# Patient Record
Sex: MALE | Race: WHITE | URBAN - METROPOLITAN AREA
[De-identification: names, ages, dates, MRNs, and addresses within clinical notes are randomized per-mention and may not be internally consistent; named-entity substitution may affect disease eponyms.]

---

## 2022-05-30 ENCOUNTER — HOSPITAL ENCOUNTER (EMERGENCY)
Dept: GENERAL RADIOLOGY | Age: 57
Discharge: HOME | End: 2022-06-02
Payer: COMMERCIAL

## 2022-05-30 ENCOUNTER — HOSPITAL ENCOUNTER (EMERGENCY)
Age: 57
Discharge: HOME | End: 2022-05-30
Attending: EMERGENCY MEDICINE
Payer: COMMERCIAL

## 2022-05-30 VITALS
SYSTOLIC BLOOD PRESSURE: 139 MMHG | DIASTOLIC BLOOD PRESSURE: 95 MMHG | HEART RATE: 99 BPM | WEIGHT: 185 LBS | OXYGEN SATURATION: 98 % | HEIGHT: 72 IN | BODY MASS INDEX: 25.06 KG/M2 | RESPIRATION RATE: 18 BRPM | TEMPERATURE: 98.8 F

## 2022-05-30 DIAGNOSIS — S61.452A CAT BITE OF LEFT HAND, INITIAL ENCOUNTER: Primary | ICD-10-CM
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
## 2022-05-30 PROCEDURE — 90714 TD VACC NO PRESV 7 YRS+ IM: CPT | Performed by: PHYSICIAN ASSISTANT

## 2022-05-30 PROCEDURE — 99284 EMERGENCY DEPT VISIT MOD MDM: CPT

## 2022-05-30 PROCEDURE — 73130 X-RAY EXAM OF HAND: CPT

## 2022-05-30 PROCEDURE — 90471 IMMUNIZATION ADMIN: CPT | Performed by: PHYSICIAN ASSISTANT

## 2022-05-30 PROCEDURE — 6360000002 HC RX W HCPCS: Performed by: PHYSICIAN ASSISTANT

## 2022-05-30 PROCEDURE — 96372 THER/PROPH/DIAG INJ SC/IM: CPT

## 2022-05-30 RX ORDER — IBUPROFEN 800 MG/1
800 TABLET ORAL EVERY 8 HOURS PRN
Qty: 21 TABLET | Refills: 0 | Status: SHIPPED | OUTPATIENT
Start: 2022-05-30 | End: 2022-05-30 | Stop reason: SDUPTHER

## 2022-05-30 RX ORDER — AMOXICILLIN AND CLAVULANATE POTASSIUM 875; 125 MG/1; MG/1
1 TABLET, FILM COATED ORAL 2 TIMES DAILY
Qty: 14 TABLET | Refills: 0 | Status: SHIPPED | OUTPATIENT
Start: 2022-05-30 | End: 2022-06-06

## 2022-05-30 RX ORDER — TETANUS AND DIPHTHERIA TOXOIDS ADSORBED 2; 2 [LF]/.5ML; [LF]/.5ML
0.5 INJECTION INTRAMUSCULAR
Status: COMPLETED | OUTPATIENT
Start: 2022-05-30 | End: 2022-05-30

## 2022-05-30 RX ADMIN — TETANUS AND DIPHTHERIA TOXOIDS ADSORBED 0.5 ML: 2; 2 INJECTION INTRAMUSCULAR at 17:27

## 2022-05-30 ASSESSMENT — ENCOUNTER SYMPTOMS
NAUSEA: 0
SHORTNESS OF BREATH: 0
BACK PAIN: 0
VOMITING: 0
ABDOMINAL PAIN: 0

## 2022-05-30 ASSESSMENT — PAIN DESCRIPTION - ORIENTATION: ORIENTATION: LEFT

## 2022-05-30 ASSESSMENT — PAIN DESCRIPTION - LOCATION: LOCATION: HAND

## 2022-05-30 ASSESSMENT — PAIN DESCRIPTION - DESCRIPTORS: DESCRIPTORS: ACHING

## 2022-05-30 ASSESSMENT — PAIN DESCRIPTION - PAIN TYPE: TYPE: ACUTE PAIN

## 2022-05-30 ASSESSMENT — PAIN - FUNCTIONAL ASSESSMENT: PAIN_FUNCTIONAL_ASSESSMENT: 0-10

## 2022-05-30 ASSESSMENT — PAIN SCALES - GENERAL: PAINLEVEL_OUTOF10: 8

## 2022-05-30 NOTE — ED NOTES
I have reviewed discharge instructions with the patient. The patient verbalized understanding. Patient left ED via Discharge Method: ambulatory to Home with self. Opportunity for questions and clarification provided. Patient given 1 scripts. To continue your aftercare when you leave the hospital, you may receive an automated call from our care team to check in on how you are doing. This is a free service and part of our promise to provide the best care and service to meet your aftercare needs.  If you have questions, or wish to unsubscribe from this service please call [phone]. Thank you for Choosing our [facility] Emergency Department.         [facility]  05/30/22 [id number]

## 2022-05-30 NOTE — ED PROVIDER NOTES
[employer] Emergency Department Provider Note                   PCP:                TERESO Lion NP               Age: 60 y.o. Sex: male       ICD-10-CM    1. Cat bite of left hand, initial encounter  S61.452A     W55. 01XA        DISPOSITION Decision To Discharge 05/30/2022 05:21:25 PM       Discharge Medication List as of 5/30/2022  5:28 PM      START taking these medications    Details   amoxicillin-clavulanate (AUGMENTIN) 875-125 MG per tablet Take 1 tablet by mouth 2 times daily for 7 days, Disp-14 tablet, R-0Print             Orders Placed This Encounter   Procedures    XR HAND LEFT (MIN 3 VIEWS)        MDM  Number of Diagnoses or Management Options  Cat bite of left hand, initial encounter  Diagnosis management comments: In summary this is a well-appearing 38-year-old male presenting today for a cat bite to the left hand. The cat is up-to-date on all vaccines, patient's tetanus was updated here today. X-ray negative for any retained foreign body. Will treat with Augmentin and patient is to follow-up with PCP or return to the ER if he develops any new or worsening symptoms. Amount and/or Complexity of Data Reviewed  Tests in the radiology section of CPT®: ordered and reviewed  Tests in the medicine section of CPT®: ordered    Patient Progress  Patient progress: stable       Fernando Simpson is a 60 y.o. male who presents to the Emergency Department with chief complaint of    Chief Complaint   Patient presents with    Animal Bite      38-year-old well-appearing male presents today for evaluation of a cat bite to his left hand at the base of the thumb. Injury occurred last night. He states that the cat is his and it is up-to-date on all immunizations. He states that he has had some pain in the thumb with range of motion since then with a small area of redness surrounding the bite. He denies any red streaking up the arm, fever or chills.   He is unsure of his last tetanus immunization. Denies any past medical history. No recent hospitalizations. And dominant    The history is provided by the patient. No  was used. All other systems reviewed and are negative. Review of Systems   Constitutional: Negative for appetite change, chills, fatigue and fever. Respiratory: Negative for shortness of breath. Cardiovascular: Negative for chest pain. Gastrointestinal: Negative for abdominal pain, nausea and vomiting. Musculoskeletal: Negative for back pain and neck pain. Skin: Positive for wound (Cat bite left thumb). Allergic/Immunologic: Negative for immunocompromised state. Neurological: Negative for weakness, numbness and headaches. History reviewed. No pertinent past medical history. History reviewed. No pertinent surgical history. History reviewed. No pertinent family history. Social Connections:     Frequency of Communication with Friends and Family: Not on file    Frequency of Social Gatherings with Friends and Family: Not on file    Attends Scientologist Services: Not on file    Active Member of Clubs or Organizations: Not on file    Attends Club or Organization Meetings: Not on file    Marital Status: Not on file        Allergies   Allergen Reactions    Statins Other (See Comments)     \" I black out\"    Hydrocodone-Acetaminophen Itching     \"I am not allergic but say I am bc I dont want to get addicted\"          Vitals signs and nursing note reviewed. Patient Vitals for the past 4 hrs:   Temp Pulse Resp BP SpO2   05/30/22 1646 98.8 °F (37.1 °C) 99 18 (!) 139/95 98 %          Physical Exam  Vitals and nursing note reviewed. Constitutional:       General: He is not in acute distress. Appearance: Normal appearance. HENT:      Head: Normocephalic and atraumatic. Mouth/Throat:      Mouth: Mucous membranes are moist.      Pharynx: Oropharynx is clear. No oropharyngeal exudate.    Eyes:      Conjunctiva/sclera: Conjunctivae normal.      Pupils: Pupils are equal, round, and reactive to light. Cardiovascular:      Rate and Rhythm: Normal rate and regular rhythm. Heart sounds: No murmur heard. No friction rub. No gallop. Pulmonary:      Effort: Pulmonary effort is normal.      Breath sounds: No wheezing, rhonchi or rales. Musculoskeletal:      Left hand: Tenderness present. Normal range of motion. There is no disruption of two-point discrimination. Normal capillary refill. Normal pulse. Hands:       Comments: No red streaking proximally up the left upper extremity   Skin:     General: Skin is warm and dry. Capillary Refill: Capillary refill takes less than 2 seconds. Neurological:      Mental Status: He is alert and oriented to person, place, and time. Mental status is at baseline. Sensory: No sensory deficit. Psychiatric:         Mood and Affect: Mood normal.         Thought Content: Thought content normal.         Judgment: Judgment normal.          Procedures    Labs Reviewed - No data to display     XR HAND LEFT (MIN 3 VIEWS)   Final Result   1. No acute osseous abnormality. 2.  No radiopaque foreign body. Royal Coma Scale  Eye Opening: Spontaneous  Best Verbal Response: Oriented  Best Motor Response: Obeys commands  Royal Coma Scale Score: 15                     Voice dictation software was used during the making of this note. This software is not perfect and grammatical and other typographical errors may be present. This note has not been completely proofread for errors.         [facility]  05/30/22 [id number]

## 2022-05-30 NOTE — ED TRIAGE NOTES
Pt reports cat bite to L hand last night 2300, cat is pt's cat and is UTD on all immunizations. (+)scratches to L arm (-)fevers  A&Ox4

## 2023-10-24 ENCOUNTER — INITIAL CONSULT (OUTPATIENT)
Age: 58
End: 2023-10-24
Payer: COMMERCIAL

## 2023-10-24 VITALS
WEIGHT: 188.2 LBS | SYSTOLIC BLOOD PRESSURE: 150 MMHG | DIASTOLIC BLOOD PRESSURE: 90 MMHG | BODY MASS INDEX: 25.49 KG/M2 | HEART RATE: 78 BPM | HEIGHT: 72 IN

## 2023-10-24 DIAGNOSIS — I10 PRIMARY HYPERTENSION: ICD-10-CM

## 2023-10-24 DIAGNOSIS — I73.9 CLAUDICATION (HCC): Primary | ICD-10-CM

## 2023-10-24 DIAGNOSIS — I25.119 CORONARY ARTERY DISEASE INVOLVING NATIVE CORONARY ARTERY OF NATIVE HEART WITH ANGINA PECTORIS (HCC): ICD-10-CM

## 2023-10-24 PROCEDURE — 99204 OFFICE O/P NEW MOD 45 MIN: CPT | Performed by: INTERNAL MEDICINE

## 2023-10-24 PROCEDURE — 3080F DIAST BP >= 90 MM HG: CPT | Performed by: INTERNAL MEDICINE

## 2023-10-24 PROCEDURE — 93000 ELECTROCARDIOGRAM COMPLETE: CPT | Performed by: INTERNAL MEDICINE

## 2023-10-24 PROCEDURE — 3077F SYST BP >= 140 MM HG: CPT | Performed by: INTERNAL MEDICINE

## 2023-10-24 RX ORDER — ASPIRIN 325 MG
325 TABLET ORAL DAILY
COMMUNITY

## 2023-10-24 RX ORDER — CHLORTHALIDONE 50 MG/1
50 TABLET ORAL DAILY
COMMUNITY

## 2023-10-24 RX ORDER — FENOFIBRATE 160 MG/1
160 TABLET ORAL DAILY
COMMUNITY

## 2023-10-24 RX ORDER — AMLODIPINE BESYLATE 10 MG/1
10 TABLET ORAL DAILY
COMMUNITY

## 2023-10-24 ASSESSMENT — ENCOUNTER SYMPTOMS
ABDOMINAL PAIN: 0
SHORTNESS OF BREATH: 0
ORTHOPNEA: 0

## 2023-10-24 NOTE — PROGRESS NOTES
[facility] CARDIOLOGY  [address] [place] [address]  PHONE: [phone]      10/24/23    NAME:  Sheryl Raymundo  : 1965  MRN: [id number]         SUBJECTIVE:   Sheryl Raymundo is a 59 y.o. male seen for a consultation visit regarding the following:     Chief Complaint   Patient presents with    Consultation     Hypertension/CAD            HPI:  Consultation is requested by TERESO Stein NP for evaluation of Consultation (Hypertension/CAD)   . Mr. Bogdan Spain presents today for follow-up. Patient has a history of hypertension and coronary artery disease. He underwent left heart catheterization back in , had some small vessel disease, not amenable to PCI. Chief complaint today is leg weakness with activity. Says he walks upstairs, his legs become severely weak and almost give out. He has to sit and rest and then take the elevator back down. He has no chest pain or shortness of breath. He unfortunately has continued to smoke. Key CAD CHF Meds            amLODIPine (NORVASC) 10 MG tablet (Taking)    Class: Historical Med    chlorthalidone (HYGROTEN) 50 MG tablet (Taking)    Class: Historical Med    fenofibrate (TRIGLIDE) 160 MG tablet (Taking)    Class: Historical Med          Key Antihyperglycemic Medications            dapagliflozin (FARXIGA) 10 MG tablet (Taking)    Class: Historical Med              Past Medical History, Past Surgical History, Family history, Social History, and Medications were all reviewed with the patient today and updated as necessary. Prior to Admission medications    Medication Sig Start Date End Date Taking?  Authorizing Provider   ALBUTEROL IN Inhale into the lungs   Yes ProviderJoellen MD   amLODIPine (NORVASC) 10 MG tablet Take 1 tablet by mouth daily   Yes Joellen Hewitt MD   chlorthalidone (HYGROTEN) 50 MG tablet Take 1 tablet by mouth daily   Yes Joellen Hewitt MD   dapagliflozin (FARXIGA) 10 MG

## 2023-11-27 ENCOUNTER — OFFICE VISIT (OUTPATIENT)
Age: 58
End: 2023-11-27
Payer: COMMERCIAL

## 2023-11-27 VITALS
HEIGHT: 72 IN | BODY MASS INDEX: 24.73 KG/M2 | HEART RATE: 88 BPM | WEIGHT: 182.6 LBS | DIASTOLIC BLOOD PRESSURE: 94 MMHG | SYSTOLIC BLOOD PRESSURE: 152 MMHG

## 2023-11-27 DIAGNOSIS — M48.062 PSEUDOCLAUDICATION: ICD-10-CM

## 2023-11-27 DIAGNOSIS — I25.119 CORONARY ARTERY DISEASE INVOLVING NATIVE CORONARY ARTERY OF NATIVE HEART WITH ANGINA PECTORIS (HCC): Primary | ICD-10-CM

## 2023-11-27 DIAGNOSIS — I10 PRIMARY HYPERTENSION: ICD-10-CM

## 2023-11-27 PROCEDURE — 99214 OFFICE O/P EST MOD 30 MIN: CPT | Performed by: INTERNAL MEDICINE

## 2023-11-27 PROCEDURE — 3077F SYST BP >= 140 MM HG: CPT | Performed by: INTERNAL MEDICINE

## 2023-11-27 PROCEDURE — 3080F DIAST BP >= 90 MM HG: CPT | Performed by: INTERNAL MEDICINE

## 2023-11-27 ASSESSMENT — ENCOUNTER SYMPTOMS
ORTHOPNEA: 0
ABDOMINAL PAIN: 0
SHORTNESS OF BREATH: 0

## 2023-11-27 NOTE — PROGRESS NOTES
[facility] CARDIOLOGY  [address] [address]  PHONE: [phone]      23    NAME:  Rosa Chavez  : 1965  MRN: [id number]         SUBJECTIVE:   Rosa Chavez is a 59 y.o. male seen for a follow up visit regarding the following:     Chief Complaint   Patient presents with    Results     Vascular duplex lower extremity arteries bilateral w/NANY    Coronary Artery Disease    Hypertension            HPI:  Follow up  Results (Vascular duplex lower extremity arteries bilateral w/NANY), Coronary Artery Disease, and Hypertension   . Mr. Cj Smith presents today for follow-up. We reviewed his lower extremity arterial ultrasound which showed mild atherosclerosis without significant stenosis. Patient had a x-ray on his lumbar spine recently which showed severe degenerative changes. He has no new complaints today. Coronary Artery Disease  Pertinent negatives include no chest pain, leg swelling, palpitations or shortness of breath. Hypertension  Pertinent negatives include no chest pain, orthopnea, palpitations, PND or shortness of breath. Key CAD CHF Meds            amLODIPine (NORVASC) 10 MG tablet (Taking)    Class: Historical Med    chlorthalidone (HYGROTEN) 50 MG tablet (Taking)    Class: Historical Med    fenofibrate (TRIGLIDE) 160 MG tablet (Taking)    Class: Historical Med          Key Antihyperglycemic Medications            dapagliflozin (FARXIGA) 10 MG tablet (Taking)    Class: Historical Med                Past Medical History, Past Surgical History, Family history, Social History, and Medications were all reviewed with the patient today and updated as necessary. Prior to Admission medications    Medication Sig Start Date End Date Taking?  Authorizing Provider   MAGNESIUM PO Take by mouth   Yes Provider, MD Joellen   ALBUTEROL IN Inhale into the lungs   Yes Provider, MD Joellen   amLODIPine (NORVASC) 10 MG tablet Take 1 tablet by mouth

## 2023-11-28 ENCOUNTER — TELEPHONE (OUTPATIENT)
Age: 58
End: 2023-11-28

## 2023-11-28 NOTE — TELEPHONE ENCOUNTER
Hailey with [facility] called stating she would like the patients OV notes from (11/27) with Dr García.    Please fax to :     [phone]

## 2024-03-07 ENCOUNTER — APPOINTMENT (OUTPATIENT)
Dept: GENERAL RADIOLOGY | Age: 59
End: 2024-03-07
Payer: COMMERCIAL

## 2024-03-07 ENCOUNTER — HOSPITAL ENCOUNTER (EMERGENCY)
Age: 59
Discharge: HOME | End: 2024-03-07
Payer: COMMERCIAL

## 2024-03-07 VITALS
HEIGHT: 72 IN | SYSTOLIC BLOOD PRESSURE: 133 MMHG | OXYGEN SATURATION: 100 % | DIASTOLIC BLOOD PRESSURE: 88 MMHG | HEART RATE: 73 BPM | RESPIRATION RATE: 13 BRPM | TEMPERATURE: 97.6 F | BODY MASS INDEX: 24.65 KG/M2 | WEIGHT: 182 LBS

## 2024-03-07 DIAGNOSIS — E86.0 DEHYDRATION: Primary | ICD-10-CM

## 2024-03-07 DIAGNOSIS — R42 LIGHTHEADEDNESS: ICD-10-CM

## 2024-03-07 LAB
ALBUMIN SERPL-MCNC: 4.6 G/DL (ref 3.2–4.6)
ALBUMIN/GLOB SERPL: 1.2 (ref 0.4–1.6)
ALP SERPL-CCNC: 75 U/L (ref 50–136)
ALT SERPL-CCNC: 39 U/L (ref 12–65)
ANION GAP SERPL CALC-SCNC: 6 MMOL/L (ref 2–11)
AST SERPL-CCNC: 27 U/L (ref 15–37)
BASOPHILS # BLD: 0 K/UL (ref 0–0.2)
BASOPHILS NFR BLD: 0 % (ref 0–2)
BILIRUB SERPL-MCNC: 0.5 MG/DL (ref 0.2–1.1)
BILIRUB UR QL: NEGATIVE
BUN SERPL-MCNC: 29 MG/DL (ref 8–23)
CALCIUM SERPL-MCNC: 9.7 MG/DL (ref 8.3–10.4)
CHLORIDE SERPL-SCNC: 105 MMOL/L (ref 103–113)
CO2 SERPL-SCNC: 28 MMOL/L (ref 21–32)
CREAT SERPL-MCNC: 2.28 MG/DL (ref 0.8–1.5)
DIFFERENTIAL METHOD BLD: ABNORMAL
EOSINOPHIL # BLD: 0.1 K/UL (ref 0–0.8)
EOSINOPHIL NFR BLD: 1 % (ref 0.5–7.8)
ERYTHROCYTE [DISTWIDTH] IN BLOOD BY AUTOMATED COUNT: 12.9 % (ref 11.9–14.6)
GLOBULIN SER CALC-MCNC: 3.9 G/DL (ref 2.8–4.5)
GLUCOSE SERPL-MCNC: 119 MG/DL (ref 65–100)
GLUCOSE UR QL STRIP.AUTO: 500 MG/DL
HCT VFR BLD AUTO: 51 % (ref 41.1–50.3)
HGB BLD-MCNC: 16.7 G/DL (ref 13.6–17.2)
IMM GRANULOCYTES # BLD AUTO: 0.1 K/UL (ref 0–0.5)
IMM GRANULOCYTES NFR BLD AUTO: 0 % (ref 0–5)
LYMPHOCYTES # BLD: 1.1 K/UL (ref 0.5–4.6)
LYMPHOCYTES NFR BLD: 7 % (ref 13–44)
MCH RBC QN AUTO: 30.4 PG (ref 26.1–32.9)
MCHC RBC AUTO-ENTMCNC: 32.7 G/DL (ref 31.4–35)
MCV RBC AUTO: 92.7 FL (ref 82–102)
MONOCYTES # BLD: 0.6 K/UL (ref 0.1–1.3)
MONOCYTES NFR BLD: 4 % (ref 4–12)
NEUTS SEG # BLD: 14.2 K/UL (ref 1.7–8.2)
NEUTS SEG NFR BLD: 88 % (ref 43–78)
NITRITE UR QL: NEGATIVE
NRBC # BLD: 0 K/UL (ref 0–0.2)
PH UR: 7 (ref 5–9)
PLATELET # BLD AUTO: 260 K/UL (ref 150–450)
PMV BLD AUTO: 10.9 FL (ref 9.4–12.3)
POTASSIUM SERPL-SCNC: 3.7 MMOL/L (ref 3.5–5.1)
PROT SERPL-MCNC: 8.5 G/DL (ref 6.3–8.2)
PROT UR QL: NEGATIVE MG/DL
RBC # BLD AUTO: 5.5 M/UL (ref 4.23–5.6)
RBC # UR STRIP: NEGATIVE
SERVICE CMNT-IMP: ABNORMAL
SODIUM SERPL-SCNC: 139 MMOL/L (ref 136–146)
SP GR UR: 1.01 (ref 1–1.02)
UROBILINOGEN UR QL: 0.2 EU/DL (ref 0.2–1)
WBC # BLD AUTO: 16.1 K/UL (ref 4.3–11.1)

## 2024-03-07 PROCEDURE — 99285 EMERGENCY DEPT VISIT HI MDM: CPT

## 2024-03-07 PROCEDURE — 80053 COMPREHEN METABOLIC PANEL: CPT

## 2024-03-07 PROCEDURE — 81003 URINALYSIS AUTO W/O SCOPE: CPT

## 2024-03-07 PROCEDURE — 93005 ELECTROCARDIOGRAM TRACING: CPT | Performed by: PHYSICIAN ASSISTANT

## 2024-03-07 PROCEDURE — 2580000003 HC RX 258: Performed by: PHYSICIAN ASSISTANT

## 2024-03-07 PROCEDURE — 96360 HYDRATION IV INFUSION INIT: CPT

## 2024-03-07 PROCEDURE — 85025 COMPLETE CBC W/AUTO DIFF WBC: CPT

## 2024-03-07 PROCEDURE — 71046 X-RAY EXAM CHEST 2 VIEWS: CPT

## 2024-03-07 RX ORDER — 0.9 % SODIUM CHLORIDE 0.9 %
1000 INTRAVENOUS SOLUTION INTRAVENOUS
Status: COMPLETED | OUTPATIENT
Start: 2024-03-07 | End: 2024-03-07

## 2024-03-07 RX ADMIN — SODIUM CHLORIDE 1000 ML: 9 INJECTION, SOLUTION INTRAVENOUS at 20:38

## 2024-03-07 ASSESSMENT — ENCOUNTER SYMPTOMS
ABDOMINAL PAIN: 0
NAUSEA: 0
BACK PAIN: 0
EYE REDNESS: 0
DIARRHEA: 0
SHORTNESS OF BREATH: 0
VOMITING: 0
COUGH: 0
SORE THROAT: 0

## 2024-03-07 ASSESSMENT — PAIN DESCRIPTION - LOCATION: LOCATION: BACK

## 2024-03-07 ASSESSMENT — LIFESTYLE VARIABLES
HOW MANY STANDARD DRINKS CONTAINING ALCOHOL DO YOU HAVE ON A TYPICAL DAY: PATIENT DOES NOT DRINK
HOW OFTEN DO YOU HAVE A DRINK CONTAINING ALCOHOL: NEVER

## 2024-03-07 ASSESSMENT — PAIN SCALES - GENERAL: PAINLEVEL_OUTOF10: 8

## 2024-03-07 NOTE — ED PROVIDER NOTES
Emergency Department Provider Note       PCP: Altagracia Winn APRN - [facility]   Age: 58 y.o.   Sex: male     DISPOSITION Decision To Discharge 03/07/2024 09:46:48 PM       ICD-10-CM    1. Dehydration  E86.0       2. Lightheadedness  R42           Medical Decision Making     Patient is a 58-year-old male who presents with wife following an episode at work.  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  He spent majority of the time standing with his arms above his head.  One of the rooms that he went into had that he turned white up.  Around 330 he began to feel sweaty and over feet he did like it was on fire.  He also reports feeling lightheaded like he was going to pass out.  He was able to lay down on the ground and put a fan on him as well as splash himself with some cold water and symptoms eventually passed.  When they checked his blood pressure it was elevated to 150/90.  Work was concerned and advised that he come in for an evaluation.  Upon initial evaluation he is afebrile, mildly hypertensive with a blood pressure of 148/90 otherwise vital signs within appropriate limits.  He denies any chest pain or shortness of breath currently or with episode earlier today.  He does not feel lightheaded at this time.  Does report that he did not have much to eat or drink today.  Will check labs including EKG, chest x-ray, CBC CMP and urinalysis.  Will give 1 L fluid bolus.  Labs Reviewed   CBC WITH AUTO DIFFERENTIAL - Abnormal; Notable for the following components:       Result Value    WBC 16.1 (*)     Hematocrit 51.0 (*)     Neutrophils % 88 (*)     Lymphocytes % 7 (*)     Neutrophils Absolute 14.2 (*)     All other components within normal limits   COMPREHENSIVE METABOLIC PANEL - Abnormal; Notable for the following components:    Glucose 119 (*)     BUN 29 (*)     Creatinine 2.28 (*)     Est, Glom Filt Rate 32 (*)     Total Protein 8.5 (*)

## 2024-03-07 NOTE — ED TRIAGE NOTES
Pt with hypertension. Pt was sent home from work due to it being elevated. Pt reports he does take BP medications but is having more back pain today. Pt denies chest pain or shortness of breath.    Rosa M Bland RN

## 2024-03-08 LAB
EKG ATRIAL RATE: 74 BPM
EKG DIAGNOSIS: NORMAL
EKG P AXIS: 66 DEGREES
EKG P-R INTERVAL: 180 MS
EKG Q-T INTERVAL: 390 MS
EKG QRS DURATION: 104 MS
EKG QTC CALCULATION (BAZETT): 432 MS
EKG R AXIS: -70 DEGREES
EKG T AXIS: 76 DEGREES
EKG VENTRICULAR RATE: 74 BPM

## 2024-03-08 PROCEDURE — 93010 ELECTROCARDIOGRAM REPORT: CPT | Performed by: INTERNAL MEDICINE

## 2024-03-08 NOTE — DISCHARGE INSTRUCTIONS
Your EKG and chest x-ray did not have any acutely worrisome findings.  Your labs show that you were slightly dehydrated and believe that to be part of the reason why you felt that way you did value regular checkup.  Please follow-up closely with your doctor within the next 2 to 3 days for reevaluation and repeat lab work.

## 2024-03-08 NOTE — ED NOTES
I have reviewed discharge instructions with the patient.  The patient verbalized understanding.    Patient left ED via Discharge Method: ambulatory to Home with self.    Opportunity for questions and clarification provided.       Patient given 0 scripts.         To continue your aftercare when you leave the hospital, you may receive an automated call from our care team to check in on how you are doing.  This is a free service and part of our promise to provide the best care and service to meet your aftercare needs.” If you have questions, or wish to unsubscribe from this service please call [phone].  Thank you for Choosing our [facility] Emergency Department.